# Patient Record
Sex: MALE | Race: WHITE | Employment: FULL TIME | URBAN - METROPOLITAN AREA
[De-identification: names, ages, dates, MRNs, and addresses within clinical notes are randomized per-mention and may not be internally consistent; named-entity substitution may affect disease eponyms.]

---

## 2023-02-17 ENCOUNTER — OFFICE VISIT (OUTPATIENT)
Dept: ORTHOPEDIC SURGERY | Age: 58
End: 2023-02-17

## 2023-02-17 VITALS — BODY MASS INDEX: 30.35 KG/M2 | HEIGHT: 72 IN

## 2023-02-17 DIAGNOSIS — M25.551 RIGHT HIP PAIN: Primary | ICD-10-CM

## 2023-02-17 NOTE — PROGRESS NOTES
Patient: Silverio Long  : 1965    MRN: [id number]    Date of Visit: 23    Attending Physician: Quintella Landau    History of Present Illness  Mr.. Brielle Avery is a very pleasant 59 y.o. patient with a several year history of progressive RIGHT hip pain. There is no precipitating event or trauma. The pain is located predominantly in the buttock and aggravated by weight bearing. Walking even short distances can be painful. The patient has tried the following interventions without sustained functional improvement:    Structured exercise/physical therapy program  NSAIDs and or tylenol      PMH/PSH:  Past Medical History:   Diagnosis Date    Headache(784.0)     Hypertension     Pneumonia     Rheumatoid arthritis(714.0) ([facility] [address]      Patient Active Problem List   Diagnosis    Polyarthritis     No past surgical history on file. MEDS:  Scheduled Meds:  Continuous Infusions:  PRN Meds:  Current Meds:No current outpatient medications on file. ALLERGIES:  No Known Allergies      Social History:   Social History     Socioeconomic History    Marital status: Single     Spouse name: Not on file    Number of children: Not on file    Years of education: Not on file    Highest education level: Not on file   Social Needs    Financial resource strain: Not on file    Food insecurity - worry: Not on file    Food insecurity - inability: Not on file    Transportation needs - medical: Not on file    Transportation needs - non-medical: Not on file   Occupational History    Not on file   Tobacco Use    Smoking status: Never Smoker    Smokeless tobacco: Never Used   Substance and Sexual Activity    Alcohol use: No     Frequency: Never    Drug use: No    Sexual activity: Not on file   Other Topics Concern    Not on file   Social History Narrative    Not on file         Family History:   No family history on file.       Review of Systems:  No personal history of DVT, PE. 12 point ROS otherwise negative other than reported in HPI. Physical Examination:  Patient is alert and oriented x 3 and appears well nourished and appropriate for today's visit. Gait: The patient walks with antalgic gait. Hips: Apparent leg lengths are equal.   There is tenderness over greater trochanteric region and pain with log roll on the RIGHT minimally  ROM is limited by pain on the RIGHT. (at 90 degrees of flexion)   Right- IR: 10, ER 45;  Left- IR:10, ER 45     Skin: Skin appears to be intact in both upper and lower extremities. There does not appear to be any ulceration or other non-healing wounds. Radiographs: AP/lateral hip and pelvis: Imaging was reviewed with the patient. There are minimal degenerative changes of the RIGHT hip. There is no radiographic evidence of AVN, fracture, or dislocation. ??   Non Operative Treatment     Assessment and Plan?: The patient has right hip pain concerning for internal derangement     We discussed the diagnosis and treatment options in detail with the patient. We have recommended non-steroidal anti-inflammatories, physical therapy, activity modification and weight loss. I will send him for a cortisone injection of his right hip I explained the diagnostic and therapeutic nature of this injection we will have him follow-up in 3 months after the injection.   Or sooner if symptoms worsen  ?___________________________   Henri Seip MD    cc: Ama Rothman MD

## 2023-02-20 ENCOUNTER — OFFICE VISIT (OUTPATIENT)
Dept: ORTHOPEDIC SURGERY | Age: 58
End: 2023-02-20

## 2023-02-20 VITALS — HEIGHT: 72 IN | WEIGHT: 223.77 LBS | BODY MASS INDEX: 30.31 KG/M2

## 2023-02-20 DIAGNOSIS — M25.551 RIGHT HIP PAIN: ICD-10-CM

## 2023-02-20 DIAGNOSIS — M16.11 PRIMARY OSTEOARTHRITIS OF RIGHT HIP: ICD-10-CM

## 2023-02-20 RX ORDER — BUPIVACAINE HYDROCHLORIDE 2.5 MG/ML
6 INJECTION, SOLUTION INFILTRATION; PERINEURAL ONCE
Status: COMPLETED | OUTPATIENT
Start: 2023-02-20 | End: 2023-02-20

## 2023-02-20 RX ORDER — METHYLPREDNISOLONE ACETATE 40 MG/ML
40 INJECTION, SUSPENSION INTRA-ARTICULAR; INTRALESIONAL; INTRAMUSCULAR; SOFT TISSUE ONCE
Status: COMPLETED | OUTPATIENT
Start: 2023-02-20 | End: 2023-02-20

## 2023-02-20 RX ORDER — LIDOCAINE HYDROCHLORIDE 10 MG/ML
5 INJECTION, SOLUTION INFILTRATION; PERINEURAL ONCE
Status: COMPLETED | OUTPATIENT
Start: 2023-02-20 | End: 2023-02-20

## 2023-02-20 RX ORDER — ROPIVACAINE HYDROCHLORIDE 5 MG/ML
3 INJECTION, SOLUTION EPIDURAL; INFILTRATION; PERINEURAL ONCE
Status: COMPLETED | OUTPATIENT
Start: 2023-02-20 | End: 2023-02-20

## 2023-02-20 RX ADMIN — LIDOCAINE HYDROCHLORIDE 5 ML: 10 INJECTION, SOLUTION INFILTRATION; PERINEURAL at 12:59

## 2023-02-20 RX ADMIN — METHYLPREDNISOLONE ACETATE 40 MG: 40 INJECTION, SUSPENSION INTRA-ARTICULAR; INTRALESIONAL; INTRAMUSCULAR; SOFT TISSUE at 13:00

## 2023-02-20 RX ADMIN — BUPIVACAINE HYDROCHLORIDE 15 MG: 2.5 INJECTION, SOLUTION INFILTRATION; PERINEURAL at 12:59

## 2023-02-20 RX ADMIN — ROPIVACAINE HYDROCHLORIDE 3 ML: 5 INJECTION, SOLUTION EPIDURAL; INFILTRATION; PERINEURAL at 13:01

## 2023-02-20 NOTE — LETTER
[facility] and [address] [place] [address]  Phone: [phone]  Fax: [phone]           Pako Andino MD      February 20, 2023     Patient: Benson Guaman   MR Number: [id number]   YOB: 1965   Date of Visit: 2/20/2023       Dear Dr. Nicolas Gipson ref. provider found: Thank you for referring Cherrie Sahni to me for evaluation/treatment. Below are the relevant portions of my assessment and plan of care. Impression: . Encounter Diagnoses   Name Primary?  Primary osteoarthritis of right hip     Right hip pain               Plan:     Postinjection protocol  Clinical follow-up with Dr. Kami Nguyễn as scheduled    The nature of the finding, probable diagnosis and likely treatment was thoroughly discussed with the patient. The options, risks, complications, alternative treatment as well as some of the differential diagnosis was discussed. The patient was thoroughly informed and all questions were answered. the patient indicated understanding and satisfaction with the discussion. Orders:        Orders Placed This Encounter   Procedures    US GUIDED NEEDLE PLACEMENT     Standing Status:   Future     Number of Occurrences:   1     Standing Expiration Date:   2/20/2024     Order Specific Question:   Reason for exam:     Answer:   CSI           Disclaimer: \"This note was dictated with voice recognition software. Though review and correction are routine, we apologize for any errors. \"           If you have questions, please do not hesitate to call me. I look forward to following Dominick Mejia along with you.     Sincerely,        Pako Andino MD    CC providers:    No Recipients

## 2023-02-20 NOTE — LETTER
[facility], [place] [address]  Phone: [phone]  Fax: [phone]    David Ponce MD        February 20, 2023     Patient: Nakul England   YOB: 1965   Date of Visit: 2/20/2023       To Whom It May Concern:    It is my medical opinion that Nakul England should remain out of work until 2/21/2023.    If you have any questions or concerns, please don't hesitate to call.    Sincerely,      David Ponce MD.      David Ponce MD

## 2023-02-20 NOTE — PROGRESS NOTES
Chief Complaint:   Chief Complaint   Patient presents with    Hip Pain     R Hip pain, referred by Dr. Liberty Kendall for a hip CSI. Increase of pain with prolonged standing, walking, or going up stairs. History of Present Illness:       Patient is a 59 y.o. male presents with the above complaint. He is seen in consultation at request of Dr. Chrissie Manriquez for consideration of ultrasound-guided intra-articular steroid injection working diagnosis of osteoarthritis right hip the symptoms began  several  yearsago and started without an injury. The patient describes a sharp, aching pain that does not radiate. The symptoms are intermittent  and are are worsening since the onset. Symptoms are predictably worsened by days and     Pain localizes posterior and lateral and  does not seems to follow  provacative pattern suggestive of greater trochanteric pain syndrome. There are not mechanical symptoms that suggest a labral injury. The patient denies subjective instability about the hip and denies new onset or progressive weakness of the lower extremity. Pain level 9    The patient denies a pattern of activity related swelling. Treatment to date: Activity modification    There is no prior history of hip trauma. There is no prior history of autoimmune disease, autoimmune disease, or crystal arthropathy. Past Medical History:        Past Medical History:   Diagnosis Date    Headache(784.0)     Hypertension     Pneumonia     Rheumatoid arthritis(714.0)        No past surgical history on file. Present Medications:         Current Outpatient Medications   Medication Sig Dispense Refill    methotrexate (RHEUMATREX) 2.5 MG chemo tablet Take 5 tablets by mouth once a week. 30 tablet 1    folic acid (FOLVITE) 1 MG tablet Take 1 tablet by mouth daily. 30 tablet 3    predniSONE (DELTASONE) 5 MG tablet Take 1 tablet by mouth daily.  50 tablet 1    HYDROcodone-acetaminophen (NORCO) 7.5-325 MG per tablet Take 1 tablet by mouth every 6 hours as needed for Pain.      meloxicam (MOBIC) 15 MG tablet Take 15 mg by mouth daily. metoprolol (LOPRESSOR) 50 MG tablet Take 50 mg by mouth daily. diazepam (VALIUM) 5 MG tablet Take 5 mg by mouth every 8 hours as needed for Anxiety. aspirin 81 MG tablet Take 81 mg by mouth daily. No current facility-administered medications for this visit. Allergies:      No Known Allergies     Social History:         Social History     Socioeconomic History    Marital status:      Spouse name: Not on file    Number of children: Not on file    Years of education: Not on file    Highest education level: Not on file   Occupational History    Not on file   Tobacco Use    Smoking status: Former    Smokeless tobacco: Not on file   Substance and Sexual Activity    Alcohol use: Yes     Alcohol/week: 5.0 standard drinks     Types: 5 Cans of beer per week    Drug use: No    Sexual activity: Not on file   Other Topics Concern    Not on file   Social History Narrative    Not on file     Social Determinants of Health     Financial Resource Strain: Not on file   Food Insecurity: Not on file   Transportation Needs: Not on file   Physical Activity: Not on file   Stress: Not on file   Social Connections: Not on file   Intimate Partner Violence: Not on file   Housing Stability: Not on file        Review of Symptoms:    Pertinent items are noted in HPI    Review of systems reviewed from Patient History Form dated on today's date and   available in the patient's chart under the Media tab. Vital Signs: There were no vitals filed for this visit. General Exam:     Constitutional: Patient is adequately groomed with no evidence of malnutrition  Mental Status: The patient is oriented to time, place and person. The patient's mood and affect are appropriate. Vascular: Examination reveals no swelling or calf tenderness. Peripheral pulses are palpable and 2+.     Lymphatics: no lymphadenopathy of the inguinal region or lower extremity      Physical Exam: lower back      Primary Exam:    Inspection: No deformity atrophy appreciable effusion      Palpation: No focal trigger point tenderness      Range of Motion: Full range and symmetric low-grade pain with end ranges reproducing his pain      Strength: Normal lower extremity      Special Tests:  Stinchfield negative      Skin: There are no rashes, ulcerations or lesions. Gait: Nonantalgic      Reflex intact lower     Additional Comments:        Additional Examinations:          Neurolgic -Light touch sensation and manual muscle testing normal L2-S1. No fasiculations. Pattella tendon and Achilles tendon reflexes +2 bilaterally. Seated SLR negative           Office Imaging Results/Procedures PerformedToday:             Office Procedures:     Orders Placed This Encounter   Procedures    US GUIDED NEEDLE PLACEMENT     Standing Status:   Future     Number of Occurrences:   1     Standing Expiration Date:   2/20/2024     Order Specific Question:   Reason for exam:     Answer:   CSI     Ultrasound-guided intra-articular hip injection    Logic E ultrasound  4 MHz-5MHz    The patient was positioned supine on examination table and the   RT     lower extremity was slightly abducted. The curvilinear probe was positioned in the medial groin region transversely. Diagnostic ultrasound was performed verifying the position of the neurovascular bundle in short axis with [place] confirmation. The probe was then translated laterally and the anterior surface of the femoral head was visualized. The probe was then rotated to coronal oblique position and the femoral acetabular joint and anterior joint recess at the femoral head/neck junction was visualized. The femoral head neck junction was visualized at approximately 5 cm. Image optimization was obtained    The position of the probe was marked. Sterile preparation was performed.   The subcutaneous and muscular tissues were anesthetized using 25-gauge 4 cm needle advanced under direct guidance using 5 cc 1% Lidocaine. The needle was then exchanged for a 22-gauge spinal needle and this was advanced in the same needle track under direct guidance using longitudinal technique to the anterior joint recess using 0.25 % marcaine. .  A total of 5cc 1% Lidocaine /3cc of 0.25% Marcaine was utilized. The syringe was exchanged and under direct guidance 1 mL of  Depo-Medrol 3 mL of 0.5% ropivacaine was injected into the anterior joint recess at the femoral acetabular joint. The anterior capsule was visualized to hydrodissect. Needle was withdrawn pressure applied to the puncture wound. Images stored    Partial anesthetic response    Technically successful intra-articular injection of the hip       Other Outside Imaging and Testing Personally Reviewed:    XR HIP 2-3 VW W PELVIS RIGHT    Result Date: 2/17/2023  Radiology exam is complete. No Radiologist dictation. Please follow up with ordering provider. Assessment   Impression: . Encounter Diagnoses   Name Primary? Primary osteoarthritis of right hip     Right hip pain               Plan:     Postinjection protocol  Clinical follow-up with Dr. Jodee Piña as scheduled    The nature of the finding, probable diagnosis and likely treatment was thoroughly discussed with the patient. The options, risks, complications, alternative treatment as well as some of the differential diagnosis was discussed. The patient was thoroughly informed and all questions were answered. the patient indicated understanding and satisfaction with the discussion. Orders:        Orders Placed This Encounter   Procedures    US GUIDED NEEDLE PLACEMENT     Standing Status:   Future     Number of Occurrences:   1     Standing Expiration Date:   2/20/2024     Order Specific Question:   Reason for exam:     Answer:   CSI           Disclaimer:     \"This note was dictated with voice recognition software. Though review and correction are routine, we apologize for any errors. \"